# Patient Record
Sex: FEMALE | Race: WHITE | NOT HISPANIC OR LATINO | Employment: OTHER | URBAN - METROPOLITAN AREA
[De-identification: names, ages, dates, MRNs, and addresses within clinical notes are randomized per-mention and may not be internally consistent; named-entity substitution may affect disease eponyms.]

---

## 2022-05-23 ENCOUNTER — NEW PATIENT (OUTPATIENT)
Dept: URBAN - METROPOLITAN AREA CLINIC 39 | Facility: CLINIC | Age: 72
End: 2022-05-23

## 2022-05-23 DIAGNOSIS — H02.831: ICD-10-CM

## 2022-05-23 DIAGNOSIS — H02.834: ICD-10-CM

## 2022-05-23 PROCEDURE — 99203 OFFICE O/P NEW LOW 30 MIN: CPT

## 2022-05-23 PROCEDURE — 92285 EXTERNAL OCULAR PHOTOGRAPHY: CPT

## 2022-05-23 ASSESSMENT — VISUAL ACUITY
OS_SC: 20/40-1
OD_SC: 20/40-1

## 2022-06-13 ENCOUNTER — TECH ONLY (OUTPATIENT)
Dept: URBAN - METROPOLITAN AREA CLINIC 39 | Facility: CLINIC | Age: 72
End: 2022-06-13

## 2022-06-13 DIAGNOSIS — H02.832: ICD-10-CM

## 2022-06-13 DIAGNOSIS — H02.835: ICD-10-CM

## 2022-06-13 DIAGNOSIS — H02.834: ICD-10-CM

## 2022-06-13 DIAGNOSIS — H02.831: ICD-10-CM

## 2022-06-13 PROCEDURE — 99211T TECH SERVICE

## 2022-09-23 ENCOUNTER — EMERGENCY VISIT (OUTPATIENT)
Dept: URBAN - METROPOLITAN AREA CLINIC 40 | Facility: CLINIC | Age: 72
End: 2022-09-23

## 2022-09-23 DIAGNOSIS — G43.B0: ICD-10-CM

## 2022-09-23 PROCEDURE — 99213 OFFICE O/P EST LOW 20 MIN: CPT

## 2022-09-23 ASSESSMENT — TONOMETRY
OD_IOP_MMHG: 12
OS_IOP_MMHG: 15

## 2022-09-23 ASSESSMENT — VISUAL ACUITY
OS_SC: 20/25
OD_SC: 20/30-2

## 2022-09-23 NOTE — PATIENT DISCUSSION
This visual field clearly demonstrated a minimum of NA% loss of upper field of vision OU, with upper lid skin in repose and elevated by taping of the lid to demonstrate potential correction. This field shows that taping the lids significantly improved this patient's superior field of vision by approximately NA%, OU.

## 2022-12-14 ENCOUNTER — TECH ONLY (OUTPATIENT)
Dept: URBAN - METROPOLITAN AREA CLINIC 39 | Facility: CLINIC | Age: 72
End: 2022-12-14

## 2022-12-14 PROCEDURE — 92082 INTERMEDIATE VISUAL FIELD XM: CPT

## 2022-12-14 PROCEDURE — 99211T TECH SERVICE

## 2022-12-14 NOTE — PATIENT DISCUSSION
This visual field clearly demonstrated a minimum of 48% loss of upper field of vision OU, with upper lid skin in repose and elevated by taping of the lid to demonstrate potential correction. This field shows that taping the lids significantly improved this patient's superior field of vision by approximately 46%, OU.

## 2023-04-17 ENCOUNTER — PRE-OP/H&P (OUTPATIENT)
Dept: URBAN - METROPOLITAN AREA SURGERY 14 | Facility: SURGERY | Age: 73
End: 2023-04-17

## 2023-04-17 ENCOUNTER — SURGERY/PROCEDURE (OUTPATIENT)
Dept: URBAN - METROPOLITAN AREA SURGERY 14 | Facility: SURGERY | Age: 73
End: 2023-04-17

## 2023-04-17 DIAGNOSIS — H02.831: ICD-10-CM

## 2023-04-17 DIAGNOSIS — H02.834: ICD-10-CM

## 2023-04-17 PROCEDURE — 99211T TECH SERVICE

## 2023-04-17 PROCEDURE — 1582350 UPPER BLEPH PER EYE FUNCTIONAL-BILATERAL

## 2023-04-28 ENCOUNTER — POST-OP (OUTPATIENT)
Dept: URBAN - METROPOLITAN AREA CLINIC 39 | Facility: CLINIC | Age: 73
End: 2023-04-28

## 2023-04-28 DIAGNOSIS — Z98.890: ICD-10-CM

## 2023-04-28 DIAGNOSIS — H02.831: ICD-10-CM

## 2023-04-28 DIAGNOSIS — H02.834: ICD-10-CM

## 2023-04-28 PROCEDURE — 92285 EXTERNAL OCULAR PHOTOGRAPHY: CPT

## 2023-04-28 PROCEDURE — 99024 POSTOP FOLLOW-UP VISIT: CPT

## 2023-04-28 ASSESSMENT — VISUAL ACUITY
OS_SC: 20/25
OD_SC: 20/30-2

## 2025-06-27 ENCOUNTER — DUPLICATE ENCOUNTER (OUTPATIENT)
Age: 75
End: 2025-06-27

## 2025-06-27 ENCOUNTER — EMERGENCY VISIT (OUTPATIENT)
Age: 75
End: 2025-06-27

## 2025-06-27 DIAGNOSIS — G51.4: ICD-10-CM

## 2025-06-27 DIAGNOSIS — H04.123: ICD-10-CM

## 2025-06-27 PROCEDURE — 99213 OFFICE O/P EST LOW 20 MIN: CPT
